# Patient Record
Sex: MALE | Race: WHITE | NOT HISPANIC OR LATINO | Employment: UNEMPLOYED | URBAN - METROPOLITAN AREA
[De-identification: names, ages, dates, MRNs, and addresses within clinical notes are randomized per-mention and may not be internally consistent; named-entity substitution may affect disease eponyms.]

---

## 2020-05-12 ENCOUNTER — COMMUNICATION - HEALTHEAST (OUTPATIENT)
Dept: SCHEDULING | Facility: CLINIC | Age: 70
End: 2020-05-12

## 2020-05-12 ENCOUNTER — AMBULATORY - HEALTHEAST (OUTPATIENT)
Dept: LAB | Facility: CLINIC | Age: 70
End: 2020-05-12

## 2020-05-12 DIAGNOSIS — U07.1 COVID-19: ICD-10-CM

## 2020-05-20 ENCOUNTER — COMMUNICATION - HEALTHEAST (OUTPATIENT)
Dept: LAB | Facility: CLINIC | Age: 70
End: 2020-05-20

## 2021-05-05 NOTE — TELEPHONE ENCOUNTER
Action    Action Taken 5-5: spoke to pt via phone. He does not have any recent records. Was originally diagnosed by [facility] in approx 1994. Has chronic fatigue, so looking to discuss with Dr. Senior that and POTS

## 2021-05-13 ENCOUNTER — VIRTUAL VISIT (OUTPATIENT)
Dept: CARDIOLOGY | Facility: CLINIC | Age: 71
End: 2021-05-13
Attending: INTERNAL MEDICINE
Payer: COMMERCIAL

## 2021-05-13 ENCOUNTER — PRE VISIT (OUTPATIENT)
Dept: CARDIOLOGY | Facility: CLINIC | Age: 71
End: 2021-05-13

## 2021-05-13 DIAGNOSIS — G90.1 DYSAUTONOMIA (H): Primary | ICD-10-CM

## 2021-05-13 DIAGNOSIS — G93.32 CHRONIC FATIGUE SYNDROME: ICD-10-CM

## 2021-05-13 PROCEDURE — 99204 OFFICE O/P NEW MOD 45 MIN: CPT | Mod: 95 | Performed by: INTERNAL MEDICINE

## 2021-05-13 NOTE — PROGRESS NOTES
"Alexx is a 71 year old who is being evaluated via a billable video visit.      How would you like to obtain your AVS? Mail a copy  If the video visit is dropped, the invitation should be resent by: Text to cell phone: [phone]  Will anyone else be joining your video visit? No    Vitals - Patient Reported  Weight (Patient Reported): 68 kg (150 lb)  Height (Patient Reported): 188 cm (6' 2\")  BMI (Based on Pt Reported Ht/Wt): 19.26  Pain Score: No Pain (0)  Pain Loc: Chest    HPI:     Mr Ramos is a 70 yo male who is self-referred to discuss his past history of chronic fatigue syndrome which was diagnosed at the [facility] in the mid 1990s.  He was also subsequently evaluated by Dr. Trip Villalobos in the [facility].  Given the positive time we do not have any of these records at hand, and in this discussion with the nursing assistant he indicated that he did not have such records either.    Patient has largely adapted to his issues with chronic fatigue but was also curious as to its relationship to postural orthostatic tachycardia syndrome and autonomic dysfunction.  At this visit I spent approximately 25 minutes with Mr. Ramos discussing current status and the evolution of clot related to chronic fatigue and POTS.  I indicated that we have limited understanding of the triggering mechanisms but that the characteristic of these conditions tend to be persistent over many years.  He was also interested in the potential for COVID-19 long-haul patients to be exhibiting similar symptoms.  I pointed out that that is an topic of the current investigation.    Mr Ramos did not appear to be concerned about specific health related issues for himself.  In the past he has been active in trying to provide support for chronic fatigue research.  He has not been involved lately.    Patient indicates that he has found lifestyle measures to try and diminish symptoms that persist.  However he also notes that his symptoms are " not as severe now as they were when he was younger.  I pointed out that we do not know the natural history of these various illnesses that seem to manifest as autonomic dysfunction, but that there is a tendency for symptoms to become less marked as one ages.  On the other hand, I pointed out that other disease conditions may start to intervene and the original complaints then become less obvious.    Mr Rachel asked about whether exercise would immediately cause an exacerbation of chronic fatigue related it might be delayed for some time.  I indicated that patients tended to differ but that for the most part exercise is encouraged in the program that starts with semirecumbent isometrics.  He was not inquiring for any specific advice for himself.    PAST MEDICAL HISTORY:  No past medical history on file.    CURRENT MEDICATIONS:  Current Outpatient Medications   Medication Sig Dispense Refill     fluticasone (FLONASE) 50 MCG/ACT nasal spray 1-2 sprays by Both Nostrils route daily. (Patient not taking: Reported on 5/13/2021) 3 Package 3       PAST SURGICAL HISTORY:  No past surgical history on file.    ALLERGIES:   No Known Allergies    FAMILY HISTORY:  No family history on file.      SOCIAL HISTORY:  Social History     Tobacco Use     Smoking status: Never Smoker     Smokeless tobacco: Never Used   Substance Use Topics     Alcohol use: Yes     Comment: occasionally     Drug use: No       ROS:   Constitutional: No fever, chills, or sweats noted.     Respiratory: No cough reported and no COVID-19 documented  GI: No nausea, vomiting mentioned.   : No symptoms mentioned.   Psychiatric: Negative.   Hematologic: No concerns mentioned.  Neuro: No specific complaints listed  Endocrinology: No significant heat or cold intolerance reported  Musculoskeletal: No specific complaints mentioned.    Exam:  There were no vitals taken for this visit.  GENERAL APPEARANCE: healthy, alert and no distress  RESPIRATORY:no rales, rhonchi  or wheezes, no use of accessory muscles, no retractions, respirations are unlabored, normal respiratory rate    NEURO: alert and oriented to person/place/time, normal speech,and affect    SKIN: no ecchymoses, no rashes    Labs:  CBC RESULTS:   No results found for: WBC, RBC, HGB, HCT, MCV, MCH, MCHC, RDW, PLT    BMP RESULTS:  No results found for: NA, POTASSIUM, CHLORIDE, CO2, ANIONGAP, GLC, BUN, CR, GFRESTIMATED, GFRESTBLACK, ЕКАТЕРИНА     INR RESULTS:  No results found for: INR    Procedures:  PULMONARY FUNCTION TESTS:   No flowsheet data found.      ECHOCARDIOGRAM:   Not available    Assessment and Plan:   1.  Chronic fatigue syndrome-diagnosed at [facility] in the 1990s-long-term follow-up  2.  Patient interested in role of chronic fatigue with POTS and other dysautonomia disturbances    Plan  1.  No specific follow-up needed unless patient contacts us    Video on 12: 00; video of 12: 25  [facility]  Patient at home; clinic [facility] heart    Total elapsed time with chart review and documentation 45 minutes    I very much appreciated the opportunity to see Mr Alexx Ramos in the clinic today. Please do not hesitate to contact my office if you have any questions or concerns.      Elie Senior MD  Cardiac Arrhythmia Service  [facility]  [phone]      CC  ELEI SENIOR

## 2021-05-13 NOTE — LETTER
Date:June 1, 2021      Patient was self referred, no letter generated. Do not send.        [facility] Health Information

## 2021-05-13 NOTE — LETTER
"5/13/2021      RE: Alexx Ramos  [address] [place] [id number]       Dear Colleague,    Thank you for the opportunity to participate in the care of your patient, Alexx Ramos, at the [facility] [place] [facility]. Please see a copy of my visit note below.    Alexx is a 71 year old who is being evaluated via a billable video visit.      How would you like to obtain your AVS? Mail a copy  If the video visit is dropped, the invitation should be resent by: Text to cell phone: [phone]  Will anyone else be joining your video visit? No    Vitals - Patient Reported  Weight (Patient Reported): 68 kg (150 lb)  Height (Patient Reported): 188 cm (6' 2\")  BMI (Based on Pt Reported Ht/Wt): 19.26  Pain Score: No Pain (0)  Pain Loc: Chest    HPI:     Mr Ramos is a 72 yo male who is self-referred to discuss his past history of chronic fatigue syndrome which was diagnosed at the [facility] in the mid 1990s.  He was also subsequently evaluated by Dr. Trip Villalobos in the [facility].  Given the positive time we do not have any of these records at hand, and in this discussion with the nursing assistant he indicated that he did not have such records either.    Patient has largely adapted to his issues with chronic fatigue but was also curious as to its relationship to postural orthostatic tachycardia syndrome and autonomic dysfunction.  At this visit I spent approximately 25 minutes with Mr. Ramos discussing current status and the evolution of clot related to chronic fatigue and POTS.  I indicated that we have limited understanding of the triggering mechanisms but that the characteristic of these conditions tend to be persistent over many years.  He was also interested in the potential for COVID-19 long-haul patients to be exhibiting similar symptoms.  I pointed out that that is an topic of the current investigation.    Mr Ramos did not " appear to be concerned about specific health related issues for himself.  In the past he has been active in trying to provide support for chronic fatigue research.  He has not been involved lately.    Patient indicates that he has found lifestyle measures to try and diminish symptoms that persist.  However he also notes that his symptoms are not as severe now as they were when he was younger.  I pointed out that we do not know the natural history of these various illnesses that seem to manifest as autonomic dysfunction, but that there is a tendency for symptoms to become less marked as one ages.  On the other hand, I pointed out that other disease conditions may start to intervene and the original complaints then become less obvious.    Mr Rachel asked about whether exercise would immediately cause an exacerbation of chronic fatigue related it might be delayed for some time.  I indicated that patients tended to differ but that for the most part exercise is encouraged in the program that starts with semirecumbent isometrics.  He was not inquiring for any specific advice for himself.    PAST MEDICAL HISTORY:  No past medical history on file.    CURRENT MEDICATIONS:  Current Outpatient Medications   Medication Sig Dispense Refill     fluticasone (FLONASE) 50 MCG/ACT nasal spray 1-2 sprays by Both Nostrils route daily. (Patient not taking: Reported on 5/13/2021) 3 Package 3       PAST SURGICAL HISTORY:  No past surgical history on file.    ALLERGIES:   No Known Allergies    FAMILY HISTORY:  No family history on file.      SOCIAL HISTORY:  Social History     Tobacco Use     Smoking status: Never Smoker     Smokeless tobacco: Never Used   Substance Use Topics     Alcohol use: Yes     Comment: occasionally     Drug use: No       ROS:   Constitutional: No fever, chills, or sweats noted.     Respiratory: No cough reported and no COVID-19 documented  GI: No nausea, vomiting mentioned.   : No symptoms mentioned.    Psychiatric: Negative.   Hematologic: No concerns mentioned.  Neuro: No specific complaints listed  Endocrinology: No significant heat or cold intolerance reported  Musculoskeletal: No specific complaints mentioned.    Exam:  There were no vitals taken for this visit.  GENERAL APPEARANCE: healthy, alert and no distress  RESPIRATORY:no rales, rhonchi or wheezes, no use of accessory muscles, no retractions, respirations are unlabored, normal respiratory rate    NEURO: alert and oriented to person/place/time, normal speech,and affect    SKIN: no ecchymoses, no rashes    Labs:  CBC RESULTS:   No results found for: WBC, RBC, HGB, HCT, MCV, MCH, MCHC, RDW, PLT    BMP RESULTS:  No results found for: NA, POTASSIUM, CHLORIDE, CO2, ANIONGAP, GLC, BUN, CR, GFRESTIMATED, GFRESTBLACK, ЕКАТЕРИНА     INR RESULTS:  No results found for: INR    Procedures:  PULMONARY FUNCTION TESTS:   No flowsheet data found.      ECHOCARDIOGRAM:   Not available    Assessment and Plan:   1.  Chronic fatigue syndrome-diagnosed at [facility] in the 1990s-long-term follow-up  2.  Patient interested in role of chronic fatigue with POTS and other dysautonomia disturbances    Plan  1.  No specific follow-up needed unless patient contacts us    Video on 12: 00; video of 12: 25  [facility]  Patient at [facility]; clinic [facility] heart    Total elapsed time with chart review and documentation 45 minutes    I very much appreciated the opportunity to see Mr Alexx Ramos in the clinic today. Please do not hesitate to contact my office if you have any questions or concerns.      Elie Senior MD  Cardiac Arrhythmia Service  [facility]  [phone]      CC  ELIE SENIOR          Please do not hesitate to contact me if you have any questions/concerns.     Sincerely,     Elie Senior MD

## 2021-06-08 NOTE — TELEPHONE ENCOUNTER
"Alexx is calling regarding antibody test.  Currently denies fever, cough and shortness of breath.  In February was in  [place] and [place].      Patient is calling requesting COVID serologic antibody testing.  NOTE: Serologic testing is a blood test for 'antibodies' which are made at 10-14 days after you have had symptoms of COVID or were exposed and had an asymptomatic infection.  This does NOT test you for 'active' infection or tell you if you are contagious.    Are you a healthcare worker?  No  Do you have cough, fever, myalgias, or shortness of breath?  No  Were you exposed to a lab confirmed positive or possible case of COVID-19?  Possible exposure 90 days ago.  Possible exposure > 14 days ago.      The patient was informed: \"Testing is limited each day and it may take time for testing to be available to everyone who has called.  We will be calling you to schedule testing- please confirm the best number to reach you is  [phone].  Lab order placed per COVID Serologic Testing standing orders.            "

## 2021-06-20 NOTE — LETTER
Letter by Nissen, Lynette, RN at      Author: Nissen, Lynette, RN Service: -- Author Type: --    Filed:  Encounter Date: 5/20/2020 Status: (Other)       5/20/2020        Alexx Ramos  [address] [place] [address]    COVID-19 Antibody Screen   Date Value Ref Range Status   05/12/2020 Negative  Final     Comment:     No COVID-19 antibodies detected.  Patients within 10 days of symptom onset for  COVID-19 may not produce sufficient levels of detectable antibodies.  Immunocompromised COVID-19 patients may take longer to develop antibodies.       You have tested NEGATIVE for COVID-19 antibodies. This suggests you have not had or been exposed to COVID-19. But it does not mean that for sure.    The test finds antibodies in most people 10 days after they get sick. For some people, it takes longer than 10 days for antibodies to show up. Others may never show antibodies against COVID-19, especially if they have weak immune systems.    If you have COVID-19 symptoms now, please stay home and away from others.     Your current symptoms may or may not be COVID-19.     What is antibody testing?  This is a kind of blood test. We take a small sample of your blood, and then test it for something called antibodies.   Your body makes antibodies to fight infection. If your blood has antibodies for a certain germ, it means youve been infected with that germ in the past.   Sometimes, antibodies stay in your body for years after youve had the infection. They can be there even if the germ didnt make you sick. They are a sign that your body fought off the infection.  Will this test find antibodies in everyone whos had COVID-19?  No. The test finds antibodies in most people 10 days after they get sick. For some people, it takes longer than 10 days for antibodies to show up. Others may never show antibodies against COVID-19, especially if they have weak immune systems.  What are the signs of COVID-19?  Signs of COVID-19 can appear  from 2 to 14 days (up to 2 weeks) after youre infected. Some people have no symptoms or only mild symptoms. Others get very sick. The most common symptoms are:      Cough    Shortness of breath or trouble breathing    Or at least 2 of these symptoms:      Fever    Chills    Repeated shaking with chills    Muscle pain    Headache    Sore throat    Losing your sense of taste or smell    You may have other symptoms. Please contact your doctor or clinic for any symptoms that worry you.    Where can I get more information?     To learn the [facility] guidelines for staying home, please visit the [place] Department of Health website at [email or web address]    To learn more about COVID-19 and how to care for yourself at home, please visit the CDC website at https://www.cdc.gov/coronavirus/2019-ncov/about/steps-when-sick.html    For more options for care at [facility], please visit our website at [email or web address]    [facility] ([facility]) COVID-19 Hotline:  [phone]      You have tested NEGATIVE for COVID-19 antibodies. This suggests you have not had or been exposed to COVID-19. But it does not mean that for sure.   The test finds antibodies in most people 10 days after they get sick. For some people, it takes longer than 10 days for antibodies to show up. Others may never show antibodies against COVID-19, especially if they have weak immune systems.  If you have COVID-19 symptoms now, please stay home and away from others.   What is antibody testing?  This is a kind of blood test. We take a small sample of your blood, and then test it for something called antibodies.   Your body makes antibodies to fight infection. If your blood has antibodies for a certain germ, it means youve been infected with that germ in the past.   Sometimes, antibodies stay in your body for years after youve had the infection. They can be there even if the germ  didnt make you sick. They are a sign that your body fought off the infection.  Will this test find antibodies in everyone whos had COVID-19?  No. The test finds antibodies in most people 10 days after they get sick. For some people, it takes longer than 10 days for antibodies to show up. Others may never show antibodies against COVID-19, especially if they have weak immune systems.  What does it mean if the test finds COVID-19 antibodies?  If we find these antibodies, it suggests:     This person has had the virus.     Their bodys immune system fought the virus.   We dont know if this will help protect someone from getting COVID-19 again. Scientists are still learning about this.  What are the signs of COVID-19?  Signs of COVID-19 can appear from 2 to 14 days (up to 2 weeks) after youre infected. Some people have no symptoms or only mild symptoms. Others get very sick. The most common symptoms are:    Cough    Shortness of breath or trouble breathing      Or at least 2 of these symptoms:      Fever    Chills    Repeated shaking with chills    Muscle pain    Headache    Sore throat    Losing your sense of taste or smell    You may have other symptoms. Please contact your doctor or clinic for any symptoms that worry you.    Where can I get more information?     To learn the [facility] guidelines for staying home, please visit the [place] Department of Health website at [email or web address]/[email or web address]/[email or web address].html    To learn more about COVID-19 and how to care for yourself at home, please visit the CDC website at https://www.cdc.gov/coronavirus/2019-ncov/about/steps-when-sick.html    For more options for care at [facility], please visit our website at [email or web address]    [facility] ([facility]) COVID-19 Hotline:  [phone]

## 2021-09-05 ENCOUNTER — HEALTH MAINTENANCE LETTER (OUTPATIENT)
Age: 71
End: 2021-09-05

## 2022-10-23 ENCOUNTER — HEALTH MAINTENANCE LETTER (OUTPATIENT)
Age: 72
End: 2022-10-23

## 2023-05-08 ENCOUNTER — MEDICAL CORRESPONDENCE (OUTPATIENT)
Dept: HEALTH INFORMATION MANAGEMENT | Facility: CLINIC | Age: 73
End: 2023-05-08
Payer: COMMERCIAL

## 2023-05-11 ENCOUNTER — TRANSCRIBE ORDERS (OUTPATIENT)
Dept: OTHER | Age: 73
End: 2023-05-11

## 2023-05-11 DIAGNOSIS — G47.52 REM SLEEP BEHAVIOR DISORDER: Primary | ICD-10-CM

## 2023-05-15 ENCOUNTER — TRANSCRIBE ORDERS (OUTPATIENT)
Dept: OTHER | Age: 73
End: 2023-05-15

## 2023-06-02 DIAGNOSIS — G47.52 RBD (REM BEHAVIORAL DISORDER): ICD-10-CM

## 2023-06-02 PROBLEM — R29.898 DECONDITIONED LOW BACK: Status: ACTIVE | Noted: 2020-06-26

## 2023-06-02 PROBLEM — L57.0 ACTINIC KERATOSIS: Status: ACTIVE | Noted: 2021-11-19

## 2023-06-02 PROBLEM — N20.0 KIDNEY STONE: Status: ACTIVE | Noted: 2021-04-29

## 2023-06-02 PROBLEM — C44.90 MALIGNANT NEOPLASM OF SKIN: Status: ACTIVE | Noted: 2022-01-06

## 2023-06-02 PROBLEM — M54.59 MECHANICAL LOW BACK PAIN: Status: ACTIVE | Noted: 2020-06-26

## 2023-06-02 RX ORDER — TACROLIMUS 1 MG/G
OINTMENT TOPICAL
COMMUNITY
Start: 2021-08-05 | End: 2023-07-13

## 2023-06-02 RX ORDER — CLINDAMYCIN PHOSPHATE 10 UG/ML
LOTION TOPICAL
COMMUNITY
Start: 2021-11-19 | End: 2023-07-13

## 2023-06-02 NOTE — PROGRESS NOTES
Patient Address Communication Language Race / Ethnicity Marital Status   [address] (Home)  [place] [address]    Former (May 11, 2015 - May 29, 2017):  [address] (Home)  [place] [address] [phone] (Mobile)  [phone] (Home)  [email or web address] English (Preferred) White / Non-      Patient Contacts    Patient Contacts  Contact Name Contact Address Communication Relationship to Patient   Kasey Ramos [address] [address] [phone] (Home)  [phone] (Work)  [phone] (Mobile) Wife, Emergency Contact   Patient Declined Unknown Unknown Emergency Contact     REM sleep behavior disorder ( the [facility] center sent Pt for further evalution)-Dr. Tee East at [facility]- records in [facility]- schedule per Pt      Tee East MD - 03/22/2023 9:10 AM CDT  Formatting of this note might be different from the original.  Chief complaint: REM sleep behavior disorder     History of present illness: 72-year-old man here for neurologic evaluation. He recently completed a sleep study last fall showing REM sleep behavior disorder. Prior to that, he had had about a 2-3 year history of dream reenactment. This maybe colored a little bit by a history of some psychological trauma which he is still trying to work out.     He denies significant changes in regard to walking, movement. He has always been a bit limited in his activity by chronic fatigue syndrome which makes aerobic exercise quite difficult for him. He does endorse hyposmia, he has a mild reduction in bowel movement frequency but not overt constipation.    Given the connection to possible future development of Parkinson's disease he was referred to the clinic here. He has done a lot of research looking at potential disease modifying or slowing treatments and had a fairly detailed print out that he gave me today.    He has looked into potential resources at the [facility] and [facility] to some degree.     Past medical history is otherwise remarkable for colon cancer status post previous colectomy. He has chronic fatigue syndrome, some depression and anxiety, and some numbness of the left foot related to a prior back injury.     Social history is notable for being retired  and . He does not smoke and drinks occasional alcohol.     Family history is not remarkable for anyone that he knows of with neurologic conditions.     Physical exam: Vital signs: Blood pressure 118/65 seated and 107/60 standing. Pulse 80 seated and 88 standing. Weight 152.1 lb.     Neurologic exam was remarkable for normal speech, no hypophonia. His voice was a bit monotone.  Extraocular movements were intact, facial expression was full.   Motor exam was remarkable for normal muscle tone, normal muscle strength, there is no tremor at rest, minimal postural tremor in both hands equally.  Coordination testing revealed normal speed and amplitude of repetitive movements, no bradykinesia.  Gait exam was remarkable for normal posture, arm swing, stride length.     Impression: 72-year-old man with history of REM sleep behavior disorder, here for baseline neurologic exam. I do not see any findings of parkinsonism on his exam today. We discussed potential risk for his sleep disorder representing an increased risk of future development of Parkinson's related conditions. Unfortunately we do not have a definitive disease slowing or modifying treatment at this time. Aerobic exercise is probably the best study and effective way to slow neurologic conditions down but he is somewhat limited in that regard. An active lifestyle would also have some benefit. We discussed brain healthy diet, typically this would be more along the lines of a Mediterranean diet.     I briefly went through his long list of potential disease slowing treatments, many of these are somewhat under studied  in Parkinson's disease or have not shown convincing benefit. Most would be relatively safe especially in terms of nutritional treatments.     For now I suggested further treatment of mental health, active lifestyle, and healthy diet. I am more than happy to see him periodically. I also gave him a couple of potential resources through the [facility] and some web sites to refer to for studies going on in this area.    A total of 62 minutes were spent on the visit, between the visit itself, chart review, documentation.     Tee East MD    Electronically signed by Tee East MD at 03/22/2023 10:27 AM CDT

## 2023-06-05 ENCOUNTER — TELEPHONE (OUTPATIENT)
Dept: NEUROLOGY | Facility: CLINIC | Age: 73
End: 2023-06-05
Payer: COMMERCIAL

## 2023-06-05 NOTE — TELEPHONE ENCOUNTER
He had a polysomnography done in September or October of 2022 at [facility] with Dr. Vishnu Mac. 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 East. He is looking to discuss neuroprotective strategies with Dr Mcwilliams. He has discussed this with Dr. East but wants another opinion on strategies outside of exercise/diet for which he was referred to Dr. Mcwilliams to discuss. He is not really interested in research at this time. Discussed updating his medication list and he would like to send some idea's to Dr. Mcwilliams, he will do this on [facility] when he is back from being out of town.

## 2023-06-09 NOTE — TELEPHONE ENCOUNTER
Action 6/9/23 MV 11.16am   Action Taken 1) Called pt and LVM to obtain verbal consent for [facility] records  2) imaging request faxed to [facility]     Action 6/9/23 MV 3.50pm   Action Taken Received VM pf patient's consent to pull [facility] recs ; [facility] Images resolved in PACS           RECORDS RECEIVED FROM: external   REASON FOR VISIT: REM sleep behavior disorder ( the [facility] sent Pt for further evalution)-   Date of Appt: 7/13/23   NOTES (FOR ALL VISITS) STATUS DETAILS   OFFICE NOTE from referring provider Care Everywhere Dr Tee East @ [facility]:  3/22/23   OFFICE NOTE from other specialist Care Everywhere Dr Sterling Mac @ [facility]:  2/10/23  10/20/22  9/21/22    Dr Judd Braga @ [facility]:  12/7/22   MEDICATION LIST Care Everywhere    IMAGING  (FOR ALL VISITS)     MRI (HEAD, NECK, SPINE) PACS [facility]:  MRI Lumbar Spine 8/11/20

## 2023-07-05 PROBLEM — G47.09 OTHER INSOMNIA: Status: ACTIVE | Noted: 2022-12-08

## 2023-07-05 RX ORDER — VALACYCLOVIR HYDROCHLORIDE 1 G/1
1 TABLET, FILM COATED ORAL DAILY
COMMUNITY
Start: 2023-05-12

## 2023-07-05 RX ORDER — TRIAMCINOLONE ACETONIDE 1 MG/G
OINTMENT TOPICAL
COMMUNITY
Start: 2021-12-14 | End: 2023-07-13

## 2023-07-05 NOTE — PROGRESS NOTES
Diagnosis/Summary/Recommendations:    PATIENT: Alexx Ramos  73 year old male     : 1950    DOMI: 2023    MRN: [id number]    Address Communication Language Race / Ethnicity Marital Status   [address] (Home)  [place] [address]    Former (May 11, 2015 - May 29, 2017):  [address] (Home)  [place] [address] [phone] (Mobile)  [phone] (Home)  [email or web address] English (Preferred) White / Non-      Patient Contacts     Patient Contacts  Contact Name Contact Address Communication Relationship to Patient   Kasey Ramos [address]  [place] [address] [phone] (Home)  [phone] (Work)  [phone] (Mobile) Wife, Emergency Contact   Patient Declined Unknown Unknown Emergency Contact       REM sleep behavior disorder ( the [facility] sent Pt for further evalution)-Dr. Tee East at [facility]- records in [facility]- schedule per Pt*PT DOES NOT NEED SLEEP STUDY AS ALREADY HAD ONE    Jessica Garcia RN sent to Job Mcwilliams MD  He had a polysomnography done in September or 2022 at [facility] sleep center with Dr. Vishnu Mac. 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 East. He is looking to discuss neuroprotective strategies with Dr Mcwilliams. He has discussed this with Dr. East but wants another opinion on strategies outside of exercise/diet for which he was referred to Dr. Mcwilliams to discuss. He is not really interested in research at this time. Discussed updating his medication list and he would like to send some idea's to Dr. Mcwilliams, he will do this on [facility] when he is back from being out of town.       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  1st and only marriage - 27 years  Wife is still working - professor of dance [employer]  Lives in [facility]  She commutes    Hallucinations/delusions       Sleep   Other insomnia  REM sleep disorder    Bladder/Renal/Prostate/Gyn/Other  Renal stone    GI/Constipation/GERD   History of colon cancer 2004    ENDO/Lipid/DM/Bone density/Thyroid      Cardio/heart/Hyper or Hypotensive       Vision/Dry Eyes/Cataracts/Glaucoma/Macular       Heme/Anticoagulation/Antiplatelet/Anemia/Other      ENT/Resp  Hearing loss   seasonal allergies    Skin/Cancer/Seborrhea/other  Cancer  Rosacea  Seborrheic dermatitis  Preoperative simplex infection  Basal cell carcinoma  Atopic dermatitis  Actinic keratoses    Musculoskeletal/Pain/Headache  Herniated disc  Radiculopathy  Leg pain  Chronic fatigue syndrome  Muscle weakness    Other:      Medications            Valacyclovir Valtrex 1000 mg                                                                                                                                  Plan:    PDgeneration - free test for people with PD  [email or web address]    There is a long list of questions     HBO - would not recommend this except for Carbon monoxide exposure  Whole body vibration - did not replicate benefit at [facility] - so unless digit  Touch therapy - no long term benefit     CR-SOP and other neuromodulatory methods  [employer] work on autism and schizophrenia    Stellate ganglion block    Chronic fatigue syndrome    Secondary insomnia    Caffeine     Ibuprofen - NSAID    Statin -     Hemicolectomy - 40% removed  Vitamin D  Vitamin B12  Consider talking with nutrition, pharmacy   Pharmacy ([facility]) consultation and medication management  Please call the scheduling number @ [phone] to set up an appointment  "with pharmacists Marta Pate or Mounika Recinos.   148 # 6'3   18.5 BMI  Consider dietary    CBT  cognitive behavioral training  Treating insomnia can be difficult because the medications we use can be harmful, especially in older adults. In addition, sleep medications do not tend to be very effective and should only be used short-term. Cognitive behavioral therapy (CBT) is the most effective treatment for long-term insomnia. The goal of CBT for insomnia is to address the underlying causes of the sleep problems, including your habits and how you think about sleep. You can do CBT with a trained psychologist, or from the comfort of your home by following an online program or workbook. Here are some great resources for at-home CBT:    1) 6-week online CBT course through [facility] for $40: [employer]/sleep  2) \"Say Jacqueline to Insomnia\" by Prosper Gavin, PhD at [employer]: 6-week program  3) \"Overcoming Insomnia: A Cognitive-Behavioral Therapy Approach Workbook\" by Ramesh Malone and Marcia Chang  4) \"Quiet Your Mind and Get to Sleep: Solutions to Insomnia for Those with Depression, Anxiety or Chronic Pain ([employer] Self-Help Workbook) by Marcia Chang and Merlyn Trammell    PET scan amyloid  Dopamine scan datscan  Skin biopsy for synuclein  Blood and spinal fluid tests for PD and alzheimer's disease    Neuropsychological evaluation    Dietary referral.     Return as needed    May consider seeing Kita or Orquidea Lara NP here    He has [facility] as his primary care network.         Coding statement:   Medical Decision Making:  #  Chronic progressive medical conditions addressed  - see above --   Review and/or interpretation of unique test or documentation from a provider outside of neurology yes   Independent historian provided additional details  no I  Prescription drug management and review of potential side effects and/or monitoring for side effects  -- see " above ---  Health impacted by social determinants of health  no    I have reviewed the note as documented above.  This accurately captures the substance of my conversation with the patient and total time spent preparing for visit, executing visit and completing visit on the day of the visit:  70 minutes.  The portion of this total time included face to face time 69 minutes    Job Mcwilliams MD     ______________________________________    Last visit date and details:     REM sleep behavior disorder ( the [facility] sent Pt for further evalution)-Dr. Tee East at [facility]- records in [facility]- schedule per Pt        Tee East MD - 03/22/2023 9:10 AM CDT  Formatting of this note might be different from the original.  Chief complaint: REM sleep behavior disorder     History of present illness: 72-year-old man here for neurologic evaluation. He recently completed a sleep study last fall showing REM sleep behavior disorder. Prior to that, he had had about a 2-3 year history of dream reenactment. This maybe colored a little bit by a history of some psychological trauma which he is still trying to work out.     He denies significant changes in regard to walking, movement. He has always been a bit limited in his activity by chronic fatigue syndrome which makes aerobic exercise quite difficult for him. He does endorse hyposmia, he has a mild reduction in bowel movement frequency but not overt constipation.    Given the connection to possible future development of Parkinson's disease he was referred to the clinic here. He has done a lot of research looking at potential disease modifying or slowing treatments and had a fairly detailed print out that he gave me today.    He has looked into potential resources at the [facility] and [facility] to some degree.     Past medical history is otherwise remarkable for colon cancer status post previous colectomy. He has  chronic fatigue syndrome, some depression and anxiety, and some numbness of the left foot related to a prior back injury.     Social history is notable for being retired  and . He does not smoke and drinks occasional alcohol.     Family history is not remarkable for anyone that he knows of with neurologic conditions.     Physical exam: Vital signs: Blood pressure 118/65 seated and 107/60 standing. Pulse 80 seated and 88 standing. Weight 152.1 lb.     Neurologic exam was remarkable for normal speech, no hypophonia. His voice was a bit monotone.  Extraocular movements were intact, facial expression was full.   Motor exam was remarkable for normal muscle tone, normal muscle strength, there is no tremor at rest, minimal postural tremor in both hands equally.  Coordination testing revealed normal speed and amplitude of repetitive movements, no bradykinesia.  Gait exam was remarkable for normal posture, arm swing, stride length.     Impression: 72-year-old man with history of REM sleep behavior disorder, here for baseline neurologic exam. I do not see any findings of parkinsonism on his exam today. We discussed potential risk for his sleep disorder representing an increased risk of future development of Parkinson's related conditions. Unfortunately we do not have a definitive disease slowing or modifying treatment at this time. Aerobic exercise is probably the best study and effective way to slow neurologic conditions down but he is somewhat limited in that regard. An active lifestyle would also have some benefit. We discussed brain healthy diet, typically this would be more along the lines of a Mediterranean diet.     I briefly went through his long list of potential disease slowing treatments, many of these are somewhat under studied in Parkinson's disease or have not shown convincing benefit. Most would be relatively safe especially in terms of nutritional treatments.     For now I  suggested further treatment of mental health, active lifestyle, and healthy diet. I am more than happy to see him periodically. I also gave him a couple of potential resources through the [facility] and some web sites to refer to for studies going on in this area.    A total of 62 minutes were spent on the visit, between the visit itself, chart review, documentation.     Tee East MD    Electronically signed by Tee East MD at 03/22/2023 10:27 AM CDT        ______________________________________      Patient was asked about 14 Review of systems including changes in vision (dry eyes, double vision), hearing, heart, lungs, musculoskeletal, depression, anxiety, snoring, RBD, insomnia, urinary frequency, urinary urgency, constipation, swallowing problems, hematological, ID, allergies, skin problems: seborrhea, endocrinological: thyroid, diabetes, cholesterol; balance, weight changes, and other neurological problems and these were not significant at this time except for   Patient Active Problem List   Diagnosis     Actinic keratosis     Atopic dermatitis     CFS (chronic fatigue syndrome)     Chronic insomnia     Chronic rhinosinusitis     Deconditioned low back     Hearing loss     Herniation of intervertebral disc     History of basal cell carcinoma     History of colon cancer     Kidney stone     Leg pain     Mechanical low back pain     Muscle weakness (generalized)     Oral herpes simplex infection     Radiculopathy     Rosacea     Seasonal allergies     Seborrheic dermatitis     RBD (REM behavioral disorder)     Malignant neoplasm of skin     Other insomnia        No Known Allergies  Past Surgical History:   Procedure Laterality Date     HEMICOLECTOMY, RT/LT Right 2004     TONSILLECTOMY      age 28 years     Past Medical History:   Diagnosis Date     RBD (REM behavioral disorder) 6/2/2023     Social History     Socioeconomic History     Marital status:      Spouse name: Not on  file     Number of children: Not on file     Years of education: Not on file     Highest education level: Not on file   Occupational History     Not on file   Tobacco Use     Smoking status: Never     Smokeless tobacco: Never   Substance and Sexual Activity     Alcohol use: Yes     Comment: occasionally     Drug use: No     Sexual activity: Never   Other Topics Concern     Parent/sibling w/ CABG, MI or angioplasty before 65F 55M? Not Asked   Social History Narrative     Not on file     Social Determinants of Health     Financial Resource Strain: Not on file   Food Insecurity: Not on file   Transportation Needs: Not on file   Physical Activity: Not on file   Stress: Not on file   Social Connections: Not on file   Intimate Partner Violence: Not on file   Housing Stability: Not on file       Drug and lactation database from the [place] National Library of Medicine:  http://toxnet.nlm.nih.gov/cgi-bin/sis/htmlgen?LACT      B/P: Data Unavailable, T: Data Unavailable, P: Data Unavailable, R: Data Unavailable 0 lbs 0 oz  There were no vitals taken for this visit., There is no height or weight on file to calculate BMI.  Medications and Vitals not listed above were documented in the cart and reviewed by me.     Current Outpatient Medications   Medication Sig Dispense Refill     triamcinolone (KENALOG) 0.1 % external ointment Apply a dab to affected lesions  sparingly (rub in well) three times a day 2 weeks (do not use on face, genitals or children)       valACYclovir (VALTREX) 1000 mg tablet Take 1 tablet by mouth daily       clindamycin (CLEOCIN T) 1 % external lotion Apply sparingly to affected area twice daily or as directed       metroNIDAZOLE (METROCREAM) 0.75 % external cream Apply topically to face twice daily.       tacrolimus (PROTOPIC) 0.1 % external ointment Apply topically to scrotum 1 -2 times daily as needed.           Job Mcwilliams MD

## 2023-07-13 ENCOUNTER — MEDICAL CORRESPONDENCE (OUTPATIENT)
Dept: HEALTH INFORMATION MANAGEMENT | Facility: CLINIC | Age: 73
End: 2023-07-13

## 2023-07-13 ENCOUNTER — OFFICE VISIT (OUTPATIENT)
Dept: NEUROLOGY | Facility: CLINIC | Age: 73
End: 2023-07-13
Payer: COMMERCIAL

## 2023-07-13 ENCOUNTER — PRE VISIT (OUTPATIENT)
Dept: NEUROLOGY | Facility: CLINIC | Age: 73
End: 2023-07-13

## 2023-07-13 VITALS — DIASTOLIC BLOOD PRESSURE: 57 MMHG | OXYGEN SATURATION: 96 % | SYSTOLIC BLOOD PRESSURE: 85 MMHG | HEART RATE: 104 BPM

## 2023-07-13 DIAGNOSIS — R41.89 COGNITIVE CHANGES: ICD-10-CM

## 2023-07-13 DIAGNOSIS — Z90.49 H/O HEMICOLECTOMY: ICD-10-CM

## 2023-07-13 DIAGNOSIS — G47.52 RBD (REM BEHAVIORAL DISORDER): Primary | ICD-10-CM

## 2023-07-13 DIAGNOSIS — Z85.038 HISTORY OF COLON CANCER: ICD-10-CM

## 2023-07-13 PROCEDURE — 99205 OFFICE O/P NEW HI 60 MIN: CPT | Performed by: PSYCHIATRY & NEUROLOGY

## 2023-07-13 NOTE — LETTER
2023       RE: Alexx Ramos  [address] [facility] [address]     Dear Colleague,    Thank you for referring your patient, Alexx Ramos, to the [facility] [place] at [facility]. Please see a copy of my visit note below.        Diagnosis/Summary/Recommendations:    PATIENT: Alexx Ramos  73 year old male     : 1950    DOMI: 2023    MRN: [id number]    Address Communication Language Race / Ethnicity Marital Status   [address] [place] [address] (Home)  [place] [address]    Former (May 11, 2015 - May 29, 2017):  [address] [place] [address] (Home)  [place] [address] [phone] (Mobile)  [phone] (Home)  [email or web address] English (Preferred) White / Non-      Patient Contacts     Patient Contacts  Contact Name Contact Address Communication Relationship to Patient   Kasey Ramos [address] [place], [place] [id number] [phone] (Home)  [phone] (Work)  [phone] (Mobile) Wife, Emergency Contact   Patient Declined Unknown Unknown Emergency Contact       REM sleep behavior disorder ( the [facility] sent Pt for further evalution)-Dr. Tee East at [facility]- records in [facility]- schedule per Pt*PT DOES NOT NEED SLEEP STUDY AS ALREADY HAD ONE    Jessica Garcia RN sent to Job Mcwilliams MD  He had a polysomnography done in September or 2022 at [facility] with Dr. Vishnu Mac. 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 East. He is looking to discuss neuroprotective strategies with Dr Mcwilliams. He has discussed this with Dr. East but wants another opinion on strategies outside of exercise/diet for which he was referred to Dr. Mcwilliams to discuss. He is not really interested in research at this time. Discussed updating his medication list and he would like to send some idea's to Dr. Mcwilliams, he will do this on [facility] when he is  back from being out of town.       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  1st and only marriage - 27 years  Wife is still working - professor of dance [employer]  Lives in [facility] [place]  She commutes    Hallucinations/delusions       Sleep   Other insomnia  REM sleep disorder    Bladder/Renal/Prostate/Gyn/Other  Renal stone    GI/Constipation/GERD   History of colon cancer 2004    ENDO/Lipid/DM/Bone density/Thyroid      Cardio/heart/Hyper or Hypotensive       Vision/Dry Eyes/Cataracts/Glaucoma/Macular       Heme/Anticoagulation/Antiplatelet/Anemia/Other      ENT/Resp  Hearing loss   seasonal allergies    Skin/Cancer/Seborrhea/other  Cancer  Rosacea  Seborrheic dermatitis  Preoperative simplex infection  Basal cell carcinoma  Atopic dermatitis  Actinic keratoses    Musculoskeletal/Pain/Headache  Herniated disc  Radiculopathy  Leg pain  Chronic fatigue syndrome  Muscle weakness    Other:      Medications            Valacyclovir Valtrex 1000 mg                                                                                                                                  Plan:    PDgeneration - free test for people with PD  Mumtaz Cantor - [email or web address]    There is a long list of questions     HBO - would not recommend this except for Carbon monoxide exposure  Whole body vibration - did not replicate benefit [facility] - so unless digit  Touch therapy - no long term benefit     CR-SOP and other neuromodulatory methods  [place] work on autism and schizophrenia    Stellate ganglion block    Chronic fatigue  "syndrome    Secondary insomnia    Caffeine     Ibuprofen - NSAID    Statin -     Hemicolectomy - 40% removed  Vitamin D  Vitamin B12  Consider talking with nutrition, pharmacy   Pharmacy (MTM) consultation and medication management  Please call the scheduling number @ [phone] to set up an appointment with pharmacists Marta Pate or Mounika Recinos.   148 # 6'3   18.5 BMI  Consider dietary    CBT  cognitive behavioral training  Treating insomnia can be difficult because the medications we use can be harmful, especially in older adults. In addition, sleep medications do not tend to be very effective and should only be used short-term. Cognitive behavioral therapy (CBT) is the most effective treatment for long-term insomnia. The goal of CBT for insomnia is to address the underlying causes of the sleep problems, including your habits and how you think about sleep. You can do CBT with a trained psychologist, or from the comfort of your home by following an online program or workbook. Here are some great resources for at-home CBT:    1) 6-week online CBT course through [facility] for $40: [employer]/sleep  2) \"Say Jacqueline to Insomnia\" by Prosper Gavin, PhD at [employer]: 6-week program  3) \"Overcoming Insomnia: A Cognitive-Behavioral Therapy Approach Workbook\" by Ramesh Malone and Marcia Chang  4) \"Quiet Your Mind and Get to Sleep: Solutions to Insomnia for Those with Depression, Anxiety or Chronic Pain ([employer] Self-Help Workbook) by Marcia Chang and Merlyn Trammell    PET scan amyloid  Dopamine scan datscan  Skin biopsy for synuclein  Blood and spinal fluid tests for PD and alzheimer's disease    Neuropsychological evaluation    Dietary referral.     Return as needed    May consider seeing Kita Lara NP here    He has [facility] as his primary care network.         Coding statement:   Medical Decision Making:  #  Chronic progressive medical " conditions addressed  - see above --   Review and/or interpretation of unique test or documentation from a provider outside of neurology yes   Independent historian provided additional details  no I  Prescription drug management and review of potential side effects and/or monitoring for side effects  -- see above ---  Health impacted by social determinants of health  no    I have reviewed the note as documented above.  This accurately captures the substance of my conversation with the patient and total time spent preparing for visit, executing visit and completing visit on the day of the visit:  70 minutes.  The portion of this total time included face to face time 69 minutes    Job Mcwilliams MD     ______________________________________    Last visit date and details:     REM sleep behavior disorder ( the [facility] sent Pt for further evalution)-Dr. Tee East at [facility]- records in [facility]- schedule per Pt        Tee East MD - 03/22/2023 9:10 AM CDT  Formatting of this note might be different from the original.  Chief complaint: REM sleep behavior disorder     History of present illness: 72-year-old man here for neurologic evaluation. He recently completed a sleep study last fall showing REM sleep behavior disorder. Prior to that, he had had about a 2-3 year history of dream reenactment. This maybe colored a little bit by a history of some psychological trauma which he is still trying to work out.     He denies significant changes in regard to walking, movement. He has always been a bit limited in his activity by chronic fatigue syndrome which makes aerobic exercise quite difficult for him. He does endorse hyposmia, he has a mild reduction in bowel movement frequency but not overt constipation.    Given the connection to possible future development of Parkinson's disease he was referred to the clinic here. He has done a lot of research looking at potential  disease modifying or slowing treatments and had a fairly detailed print out that he gave me today.    He has looked into potential resources at the [facility] and [facility] to some degree.     Past medical history is otherwise remarkable for colon cancer status post previous colectomy. He has chronic fatigue syndrome, some depression and anxiety, and some numbness of the left foot related to a prior back injury.     Social history is notable for being retired  and . He does not smoke and drinks occasional alcohol.     Family history is not remarkable for anyone that he knows of with neurologic conditions.     Physical exam: Vital signs: Blood pressure 118/65 seated and 107/60 standing. Pulse 80 seated and 88 standing. Weight 152.1 lb.     Neurologic exam was remarkable for normal speech, no hypophonia. His voice was a bit monotone.  Extraocular movements were intact, facial expression was full.   Motor exam was remarkable for normal muscle tone, normal muscle strength, there is no tremor at rest, minimal postural tremor in both hands equally.  Coordination testing revealed normal speed and amplitude of repetitive movements, no bradykinesia.  Gait exam was remarkable for normal posture, arm swing, stride length.     Impression: 72-year-old man with history of REM sleep behavior disorder, here for baseline neurologic exam. I do not see any findings of parkinsonism on his exam today. We discussed potential risk for his sleep disorder representing an increased risk of future development of Parkinson's related conditions. Unfortunately we do not have a definitive disease slowing or modifying treatment at this time. Aerobic exercise is probably the best study and effective way to slow neurologic conditions down but he is somewhat limited in that regard. An active lifestyle would also have some benefit. We discussed brain healthy diet, typically this would be more along the  lines of a Mediterranean diet.     I briefly went through his long list of potential disease slowing treatments, many of these are somewhat under studied in Parkinson's disease or have not shown convincing benefit. Most would be relatively safe especially in terms of nutritional treatments.     For now I suggested further treatment of mental health, active lifestyle, and healthy diet. I am more than happy to see him periodically. I also gave him a couple of potential resources through the [facility] and some web sites to refer to for studies going on in this area.    A total of 62 minutes were spent on the visit, between the visit itself, chart review, documentation.     Tee East MD    Electronically signed by Tee East MD at 03/22/2023 10:27 AM CDT        ______________________________________      Patient was asked about 14 Review of systems including changes in vision (dry eyes, double vision), hearing, heart, lungs, musculoskeletal, depression, anxiety, snoring, RBD, insomnia, urinary frequency, urinary urgency, constipation, swallowing problems, hematological, ID, allergies, skin problems: seborrhea, endocrinological: thyroid, diabetes, cholesterol; balance, weight changes, and other neurological problems and these were not significant at this time except for   Patient Active Problem List   Diagnosis    Actinic keratosis    Atopic dermatitis    CFS (chronic fatigue syndrome)    Chronic insomnia    Chronic rhinosinusitis    Deconditioned low back    Hearing loss    Herniation of intervertebral disc    History of basal cell carcinoma    History of colon cancer    Kidney stone    Leg pain    Mechanical low back pain    Muscle weakness (generalized)    Oral herpes simplex infection    Radiculopathy    Rosacea    Seasonal allergies    Seborrheic dermatitis    RBD (REM behavioral disorder)    Malignant neoplasm of skin    Other insomnia        No Known Allergies  Past Surgical History:    Procedure Laterality Date    HEMICOLECTOMY, RT/LT Right 2004    TONSILLECTOMY      age 28 years     Past Medical History:   Diagnosis Date    RBD (REM behavioral disorder) 6/2/2023     Social History     Socioeconomic History    Marital status:      Spouse name: Not on file    Number of children: Not on file    Years of education: Not on file    Highest education level: Not on file   Occupational History    Not on file   Tobacco Use    Smoking status: Never    Smokeless tobacco: Never   Substance and Sexual Activity    Alcohol use: Yes     Comment: occasionally    Drug use: No    Sexual activity: Never   Other Topics Concern    Parent/sibling w/ CABG, MI or angioplasty before 65F 55M? Not Asked   Social History Narrative    Not on file     Social Determinants of Health     Financial Resource Strain: Not on file   Food Insecurity: Not on file   Transportation Needs: Not on file   Physical Activity: Not on file   Stress: Not on file   Social Connections: Not on file   Intimate Partner Violence: Not on file   Housing Stability: Not on file       Drug and lactation database from the [place] National Library of Medicine:  http://toxnet.nlm.nih.gov/cgi-bin/sis/htmlgen?LACT      B/P: Data Unavailable, T: Data Unavailable, P: Data Unavailable, R: Data Unavailable 0 lbs 0 oz  There were no vitals taken for this visit., There is no height or weight on file to calculate BMI.  Medications and Vitals not listed above were documented in the cart and reviewed by me.     Current Outpatient Medications   Medication Sig Dispense Refill    triamcinolone (KENALOG) 0.1 % external ointment Apply a dab to affected lesions  sparingly (rub in well) three times a day 2 weeks (do not use on face, genitals or children)      valACYclovir (VALTREX) 1000 mg tablet Take 1 tablet by mouth daily      clindamycin (CLEOCIN T) 1 % external lotion Apply sparingly to affected area twice daily or as directed      metroNIDAZOLE (METROCREAM)  0.75 % external cream Apply topically to face twice daily.      tacrolimus (PROTOPIC) 0.1 % external ointment Apply topically to scrotum 1 -2 times daily as needed.           Job Mcwilliams MD

## 2023-07-13 NOTE — PATIENT INSTRUCTIONS
"    Medications            Valacyclovir Valtrex 1000 mg                                                                                                                                  Plan:    PDgeneration - free test for people with PD  Mumtaz Cantor - [email or web address]    There is a long list of questions     HBO - would not recommend this except for Carbon monoxide exposure  Whole body vibration - did not replicate benefit at [facility] - so unless digit  Touch therapy - no long term benefit     CR-SOP and other neuromodulatory methods  [employer] work on autism and schizophrenia    Stellate ganglion block    Chronic fatigue syndrome    Secondary insomnia    Caffeine     Ibuprofen - NSAID    Statin -     Hemicolectomy - 40% removed  Vitamin D  Vitamin B12  Consider talking with nutrition, pharmacy   Pharmacy (MTM) consultation and medication management  Please call the scheduling number @ [phone] to set up an appointment with pharmacists Marta Pate or Mounika Recinos.   148 # 6'3   18.5 BMI  Consider dietary    CBT  cognitive behavioral training  Treating insomnia can be difficult because the medications we use can be harmful, especially in older adults. In addition, sleep medications do not tend to be very effective and should only be used short-term. Cognitive behavioral therapy (CBT) is the most effective treatment for long-term insomnia. The goal of CBT for insomnia is to address the underlying causes of the sleep problems, including your habits and how you think about sleep. You can do CBT with a trained psychologist, or from the comfort of your home by following an online program or workbook. Here are some great resources for at-home CBT:    1) 6-week online CBT course through [facility] for $40: [employer]/sleep  2) \"Say Jacqueline to Insomnia\" by Prosper Gavin, PhD at [employer]: 6-week program  3) \"Overcoming Insomnia: A Cognitive-Behavioral Therapy Approach " "Workbook\" by Ramesh Malone and Marcia Chang  4) \"Quiet Your Mind and Get to Sleep: Solutions to Insomnia for Those with Depression, Anxiety or Chronic Pain ([employer] Self-Help Workbook) by Marcia Chang and Merlyn Trammell    PET scan amyloid  Dopamine scan datscan  Skin biopsy for synuclein  Blood and spinal fluid tests for PD and alzheimer's disease    Neuropsychological evaluation    Dietary referral.     Return as needed    May consider seeing Kita Lara NP here    He has [facility] as his primary care network.   "

## 2023-07-21 ENCOUNTER — TELEPHONE (OUTPATIENT)
Dept: NEUROPSYCHOLOGY | Facility: CLINIC | Age: 73
End: 2023-07-21
Payer: COMMERCIAL

## 2023-11-11 ENCOUNTER — HEALTH MAINTENANCE LETTER (OUTPATIENT)
Age: 73
End: 2023-11-11

## 2024-12-28 ENCOUNTER — HEALTH MAINTENANCE LETTER (OUTPATIENT)
Age: 74
End: 2024-12-28